# Patient Record
Sex: MALE | Race: WHITE | NOT HISPANIC OR LATINO | Employment: FULL TIME | URBAN - METROPOLITAN AREA
[De-identification: names, ages, dates, MRNs, and addresses within clinical notes are randomized per-mention and may not be internally consistent; named-entity substitution may affect disease eponyms.]

---

## 2017-08-07 ENCOUNTER — HOSPITAL ENCOUNTER (EMERGENCY)
Facility: HOSPITAL | Age: 27
Discharge: PSYCHIATRIC OR SUBSTANCE USE FACILITY | End: 2017-08-08
Attending: EMERGENCY MEDICINE
Payer: MEDICAID

## 2017-08-07 DIAGNOSIS — F29 PSYCHOSIS, UNSPECIFIED PSYCHOSIS TYPE: Primary | ICD-10-CM

## 2017-08-07 PROBLEM — F41.1 ANXIETY STATE, UNSPECIFIED: Status: ACTIVE | Noted: 2017-08-07

## 2017-08-07 PROBLEM — F33.1 MODERATE EPISODE OF RECURRENT MAJOR DEPRESSIVE DISORDER: Chronic | Status: ACTIVE | Noted: 2017-08-07

## 2017-08-07 PROBLEM — F10.20 ALCOHOL USE DISORDER, MODERATE, IN CONTROLLED ENVIRONMENT: Status: ACTIVE | Noted: 2017-08-07

## 2017-08-07 PROBLEM — F43.10 PTSD (POST-TRAUMATIC STRESS DISORDER): Chronic | Status: ACTIVE | Noted: 2017-08-07

## 2017-08-07 PROBLEM — F11.20: Chronic | Status: ACTIVE | Noted: 2017-08-07

## 2017-08-07 LAB
ALBUMIN SERPL BCP-MCNC: 3.6 G/DL
ALP SERPL-CCNC: 91 U/L
ALT SERPL W/O P-5'-P-CCNC: 16 U/L
AMPHET+METHAMPHET UR QL: NEGATIVE
ANION GAP SERPL CALC-SCNC: 9 MMOL/L
APAP SERPL-MCNC: <3 UG/ML
AST SERPL-CCNC: 25 U/L
BARBITURATES UR QL SCN>200 NG/ML: NEGATIVE
BASOPHILS # BLD AUTO: 0.01 K/UL
BASOPHILS NFR BLD: 0.1 %
BENZODIAZ UR QL SCN>200 NG/ML: NEGATIVE
BILIRUB SERPL-MCNC: 0.6 MG/DL
BILIRUB UR QL STRIP: NEGATIVE
BUN SERPL-MCNC: 13 MG/DL
BZE UR QL SCN: NEGATIVE
CALCIUM SERPL-MCNC: 9.7 MG/DL
CANNABINOIDS UR QL SCN: NORMAL
CHLORIDE SERPL-SCNC: 98 MMOL/L
CLARITY UR REFRACT.AUTO: ABNORMAL
CO2 SERPL-SCNC: 29 MMOL/L
COLOR UR AUTO: ABNORMAL
CREAT SERPL-MCNC: 0.9 MG/DL
CREAT UR-MCNC: 367 MG/DL
DIFFERENTIAL METHOD: ABNORMAL
EOSINOPHIL # BLD AUTO: 0.2 K/UL
EOSINOPHIL NFR BLD: 1.8 %
ERYTHROCYTE [DISTWIDTH] IN BLOOD BY AUTOMATED COUNT: 14.3 %
EST. GFR  (AFRICAN AMERICAN): >60 ML/MIN/1.73 M^2
EST. GFR  (NON AFRICAN AMERICAN): >60 ML/MIN/1.73 M^2
ETHANOL SERPL-MCNC: <10 MG/DL
GLUCOSE SERPL-MCNC: 77 MG/DL
GLUCOSE UR QL STRIP: NEGATIVE
HCT VFR BLD AUTO: 47.6 %
HGB BLD-MCNC: 16 G/DL
HGB UR QL STRIP: NEGATIVE
KETONES UR QL STRIP: NEGATIVE
LEUKOCYTE ESTERASE UR QL STRIP: NEGATIVE
LYMPHOCYTES # BLD AUTO: 2.3 K/UL
LYMPHOCYTES NFR BLD: 24.5 %
MCH RBC QN AUTO: 29.4 PG
MCHC RBC AUTO-ENTMCNC: 33.6 G/DL
MCV RBC AUTO: 88 FL
METHADONE UR QL SCN>300 NG/ML: NEGATIVE
MONOCYTES # BLD AUTO: 1.1 K/UL
MONOCYTES NFR BLD: 11.2 %
NEUTROPHILS # BLD AUTO: 5.9 K/UL
NEUTROPHILS NFR BLD: 62.2 %
NITRITE UR QL STRIP: NEGATIVE
OPIATES UR QL SCN: NORMAL
PCP UR QL SCN>25 NG/ML: NEGATIVE
PH UR STRIP: 6 [PH] (ref 5–8)
PLATELET # BLD AUTO: 244 K/UL
PMV BLD AUTO: 10.4 FL
POTASSIUM SERPL-SCNC: 4 MMOL/L
PROT SERPL-MCNC: 6.8 G/DL
PROT UR QL STRIP: ABNORMAL
RBC # BLD AUTO: 5.44 M/UL
SODIUM SERPL-SCNC: 136 MMOL/L
SP GR UR STRIP: 1.01 (ref 1–1.03)
TOXICOLOGY INFORMATION: NORMAL
TSH SERPL DL<=0.005 MIU/L-ACNC: 1.7 UIU/ML
URN SPEC COLLECT METH UR: ABNORMAL
UROBILINOGEN UR STRIP-ACNC: NEGATIVE EU/DL
WBC # BLD AUTO: 9.55 K/UL

## 2017-08-07 PROCEDURE — 85025 COMPLETE CBC W/AUTO DIFF WBC: CPT

## 2017-08-07 PROCEDURE — 80053 COMPREHEN METABOLIC PANEL: CPT

## 2017-08-07 PROCEDURE — 80307 DRUG TEST PRSMV CHEM ANLYZR: CPT

## 2017-08-07 PROCEDURE — 80329 ANALGESICS NON-OPIOID 1 OR 2: CPT

## 2017-08-07 PROCEDURE — 81003 URINALYSIS AUTO W/O SCOPE: CPT

## 2017-08-07 PROCEDURE — 84443 ASSAY THYROID STIM HORMONE: CPT

## 2017-08-07 PROCEDURE — 80320 DRUG SCREEN QUANTALCOHOLS: CPT

## 2017-08-07 PROCEDURE — 25000003 PHARM REV CODE 250: Performed by: PSYCHIATRY & NEUROLOGY

## 2017-08-07 RX ORDER — DIAZEPAM 5 MG/1
10 TABLET ORAL 3 TIMES DAILY
Status: DISCONTINUED | OUTPATIENT
Start: 2017-08-07 | End: 2017-08-08 | Stop reason: HOSPADM

## 2017-08-07 RX ORDER — METHOCARBAMOL 500 MG/1
500 TABLET, FILM COATED ORAL 4 TIMES DAILY PRN
Status: DISCONTINUED | OUTPATIENT
Start: 2017-08-08 | End: 2017-08-08 | Stop reason: HOSPADM

## 2017-08-07 RX ORDER — DICYCLOMINE HYDROCHLORIDE 20 MG/1
20 TABLET ORAL EVERY 6 HOURS PRN
Status: DISCONTINUED | OUTPATIENT
Start: 2017-08-08 | End: 2017-08-08 | Stop reason: HOSPADM

## 2017-08-07 RX ORDER — CLONIDINE HYDROCHLORIDE 0.1 MG/1
0.1 TABLET ORAL EVERY 4 HOURS PRN
Status: DISCONTINUED | OUTPATIENT
Start: 2017-08-08 | End: 2017-08-08 | Stop reason: HOSPADM

## 2017-08-07 RX ORDER — LORAZEPAM 2 MG/ML
2 INJECTION INTRAMUSCULAR EVERY 4 HOURS PRN
Status: DISCONTINUED | OUTPATIENT
Start: 2017-08-08 | End: 2017-08-07

## 2017-08-07 RX ORDER — FLUOXETINE HYDROCHLORIDE 20 MG/1
40 CAPSULE ORAL DAILY
Status: DISCONTINUED | OUTPATIENT
Start: 2017-08-08 | End: 2017-08-08 | Stop reason: HOSPADM

## 2017-08-07 RX ORDER — IBUPROFEN 600 MG/1
600 TABLET ORAL EVERY 6 HOURS PRN
Status: DISCONTINUED | OUTPATIENT
Start: 2017-08-08 | End: 2017-08-08 | Stop reason: HOSPADM

## 2017-08-07 RX ORDER — DOCUSATE SODIUM 100 MG/1
100 CAPSULE, LIQUID FILLED ORAL DAILY PRN
Status: DISCONTINUED | OUTPATIENT
Start: 2017-08-08 | End: 2017-08-08 | Stop reason: HOSPADM

## 2017-08-07 RX ORDER — LOPERAMIDE HYDROCHLORIDE 2 MG/1
2 CAPSULE ORAL 4 TIMES DAILY PRN
Status: DISCONTINUED | OUTPATIENT
Start: 2017-08-08 | End: 2017-08-08 | Stop reason: HOSPADM

## 2017-08-07 RX ORDER — HYDROXYZINE PAMOATE 50 MG/1
50 CAPSULE ORAL 3 TIMES DAILY PRN
Status: DISCONTINUED | OUTPATIENT
Start: 2017-08-08 | End: 2017-08-08 | Stop reason: HOSPADM

## 2017-08-07 RX ORDER — LORAZEPAM 1 MG/1
2 TABLET ORAL EVERY 4 HOURS PRN
Status: DISCONTINUED | OUTPATIENT
Start: 2017-08-08 | End: 2017-08-08 | Stop reason: HOSPADM

## 2017-08-07 RX ORDER — LORAZEPAM 1 MG/1
2 TABLET ORAL EVERY 4 HOURS PRN
Status: DISCONTINUED | OUTPATIENT
Start: 2017-08-08 | End: 2017-08-07

## 2017-08-07 RX ORDER — ONDANSETRON 4 MG/1
8 TABLET, ORALLY DISINTEGRATING ORAL EVERY 8 HOURS PRN
Status: DISCONTINUED | OUTPATIENT
Start: 2017-08-08 | End: 2017-08-08 | Stop reason: HOSPADM

## 2017-08-07 RX ORDER — LORAZEPAM 2 MG/ML
2 INJECTION INTRAMUSCULAR EVERY 4 HOURS PRN
Status: DISCONTINUED | OUTPATIENT
Start: 2017-08-08 | End: 2017-08-08 | Stop reason: HOSPADM

## 2017-08-07 RX ADMIN — DIAZEPAM 10 MG: 5 TABLET ORAL at 11:08

## 2017-08-07 NOTE — ED NOTES
Bed: [facility] 01  Expected date:   Expected time:   Means of arrival:   Comments:

## 2017-08-07 NOTE — ED TRIAGE NOTES
Reports anxiety, depression, PTSD, and heavy drinking with thoughts of intermittent contemplation of harming self or others. Denies any specific suicide plan or thoughts at this time. Would like to get help to drink less and get on a better path. Last drink last night.    Security and sitter at bedside documenting on flowsheet q 15 minutes and maintain direct visualization.

## 2017-08-07 NOTE — ED PROVIDER NOTES
Encounter Date: 8/7/2017       History     Chief Complaint   Patient presents with    Suicidal     having thoughts of hurting myself,     Pt is a 25yo M with history of anxiety/PTSD and depression who presents with suicidal ideation. The pt states he has been having worsening panic attacks due to PTSD from his time as a . These attacks have been particularly worse over the past month, but he has been treated with Prozac for anxiety and depression over the past year by his PCP. The pt states he has been coping with his anxiety using alcohol, and has been drinking about 1 pint per day. No history of alcohol withdrawal seizures or Dts. The pt also takes suboxone for a history of opioid dependence after using pain medication. No history of heroin use. Pt uses marijuana regulary, last used 4 days ago. He states he has thoughts of suicide when having these attacks, but does not have a specific plan. He is also concerned he will injury someone around him when he drinks and has an anxiety attack.          Review of patient's allergies indicates:  No Known Allergies  Past Medical History:   Diagnosis Date    Anxiety     Depression     PTSD (post-traumatic stress disorder)      Past Surgical History:   Procedure Laterality Date    APPENDECTOMY  2016     History reviewed. No pertinent family history.  Social History   Substance Use Topics    Smoking status: Current Every Day Smoker     Packs/day: 0.75     Years: 10.00     Types: Cigarettes    Smokeless tobacco: Never Used    Alcohol use Yes     Review of Systems   Constitutional: Negative for fatigue and fever.   HENT: Negative for congestion and rhinorrhea.    Eyes: Negative for pain and discharge.   Respiratory: Negative for cough, chest tightness and shortness of breath.    Cardiovascular: Negative for chest pain and leg swelling.   Gastrointestinal: Negative for abdominal distention and abdominal pain.   Genitourinary: Negative for difficulty  urinating and dysuria.   Skin: Negative for color change and pallor.   Neurological: Negative for syncope and headaches.   Psychiatric/Behavioral: Positive for agitation, behavioral problems, sleep disturbance and suicidal ideas. Negative for confusion.       Physical Exam     Initial Vitals [08/07/17 1631]   BP Pulse Resp Temp SpO2   (!) 143/95 97 18 98.2 °F (36.8 °C) 99 %      MAP       111         Physical Exam    Constitutional: He appears well-developed and well-nourished. No distress.   HENT:   Head: Normocephalic and atraumatic.   Eyes: Conjunctivae and EOM are normal.   Neck: Normal range of motion. Neck supple.   Cardiovascular: Normal rate, regular rhythm and normal heart sounds.   Pulmonary/Chest: Breath sounds normal. No respiratory distress. He has no wheezes. He has no rhonchi.   Abdominal: Soft. He exhibits no distension. There is no tenderness.   Musculoskeletal: Normal range of motion. He exhibits no edema.   Neurological: He is alert and oriented to person, place, and time.   Skin: Skin is warm and dry. No rash noted. No erythema.   Psychiatric:   Pt has appropriate affect. He appears clean and kempt. States he has SI and is concerned he will hurt someone with anxiety attacks.          ED Course   Procedures  Labs Reviewed   CBC W/ AUTO DIFFERENTIAL   COMPREHENSIVE METABOLIC PANEL   TSH   URINALYSIS   DRUG SCREEN PANEL, URINE EMERGENCY   ALCOHOL,MEDICAL (ETHANOL)   ACETAMINOPHEN LEVEL             Medical Decision Making:   Initial Assessment:   Pt is a 27yo M with h/o PTSD and depression who presents for psychiatric evaluation, with SI and HI during anxiety attacks. Pt is cooperative and pleasant on exam.  Differential Diagnosis:   Depression  Suicidal behavior  Anxiety/PTSD  Alcohol use dependence  Rule out other drug use (takes suboxone for prior opioid dependence, uses marijuana)  Electrolyte abnormality, hypothyroidism, anemia  ED Management:  Psych screening tests ordered to rule out medical  causes. Consult to psych placed. Pt will be placed in PEC pending Dr. Hernandez's assessment.     Laureano Peguero MD  Resident, PGY-1  7:15 PM    All labs appear within nl limits. Psychiatry at bedside. PEC obtained.    Laureano Peguero MD  Resident, PGY-1  8:21 PM              Attending Attestation:             Attending ED Notes:   Patient evaluated in the ED because of suicidal ideation patient has history of PTSD and depression.  Patient was PEC'd in the ED and psychiatry came and saw the patient and is felt that the patient should be placed in an institution and the patient was transferred because there were no available beds here. Patient is medically cleared for transfer.          ED Course     Clinical Impression:   The encounter diagnosis was Psychosis, unspecified psychosis type.    Disposition:   Disposition: Transferred  Condition: Serious                        Laureano Peguero MD  Resident  08/16/17 0122       Russell Sims MD  08/31/17 1331       Russell Sims MD  11/28/19 9638

## 2017-08-07 NOTE — ED NOTES
Dr. Peguero states okay for patient to remain in clothes until psych or Dr. Hernandez evaluates patient.

## 2017-08-08 VITALS
WEIGHT: 150 LBS | DIASTOLIC BLOOD PRESSURE: 82 MMHG | HEIGHT: 69 IN | HEART RATE: 72 BPM | BODY MASS INDEX: 22.22 KG/M2 | OXYGEN SATURATION: 100 % | RESPIRATION RATE: 18 BRPM | TEMPERATURE: 98 F | SYSTOLIC BLOOD PRESSURE: 134 MMHG

## 2017-08-08 PROCEDURE — 25000003 PHARM REV CODE 250: Performed by: PSYCHIATRY & NEUROLOGY

## 2017-08-08 PROCEDURE — 99285 EMERGENCY DEPT VISIT HI MDM: CPT

## 2017-08-08 PROCEDURE — 99499 UNLISTED E&M SERVICE: CPT | Mod: ,,, | Performed by: EMERGENCY MEDICINE

## 2017-08-08 RX ADMIN — HYDROXYZINE PAMOATE 50 MG: 50 CAPSULE ORAL at 12:08

## 2017-08-08 NOTE — ED NOTES
Admission packet faxed to [facility], [facility], [facility], [facility], [facility]), [facility], [facility], [facility], [facility], [facility], [facility], [facility], [facility], [facility], [facility].

## 2017-08-08 NOTE — ASSESSMENT & PLAN NOTE
- Continue PEC for danger to self and possibly others due to SI. Seek placement on an inpatient psychiatric unit for safety and stabilization  - Continue home Prozac 40 mg daily. Defer changes to inpatient team  - Suicide precautions

## 2017-08-08 NOTE — ASSESSMENT & PLAN NOTE
- Hold Suboxone for now, defer re-starting to inpatient team  - Opiate withdrawal PRNs:  Bentyl 20 mg q6h PRN stomach cramps  Colace 100 mg daily PRN constipation  Vistaril 50 mg TID PRN anxiety, insomnia, or itching  Ibuprofen 600 mg q6h PRN pain or temperature >100.5  Imodium 2 mg QID PRN diarrhea  Robaxin 500 mg QID PRN muscle spasms  Zofran 8 mg q8h PRN nausea  Clonidine 0.1 mg PO q4h PRN opiate withdrawal

## 2017-08-08 NOTE — SUBJECTIVE & OBJECTIVE
Patient History           Medical as of 8/7/2017     Past Medical History Date Comments Source    ADHD (attention deficit hyperactivity disorder) -- -- Provider    Anxiety -- -- Provider    Depression -- -- Provider    Hx of psychiatric care -- -- Provider    Psychiatric problem -- -- Provider    PTSD (post-traumatic stress disorder) -- -- Provider    Pertinent Negatives Date Noted Comments Source    Bipolar disorder 8/7/2017 -- Provider    History of psychiatric hospitalization 8/7/2017 -- Provider    Leeann 8/7/2017 -- Provider    Psychosis 8/7/2017 -- Provider    Schizoaffective disorder 8/7/2017 -- Provider    Seizures 8/7/2017 -- Provider    Suicide attempt 8/7/2017 -- Provider    Therapy 8/7/2017 -- Provider            Surgical as of 8/7/2017     Past Surgical History Laterality Date Comments Source    APPENDECTOMY -- 2016 -- Provider            Family as of 8/7/2017    **None**           Tobacco Use as of 8/7/2017     Smoking Status Smoking Start Date Smoking Quit Date Packs/day Years Used    Current Every Day Smoker -- -- 0.75 10.00    Types Comments Smokeless Tobacco Status Smokeless Tobacco Quit Date Source     Cigarettes -- Never Used -- Provider            Alcohol Use as of 8/7/2017     Alcohol Use Drinks/Week Alcohol/Week Comments Source    Yes -- -- 1 pint of liquor daily Provider            Drug Use as of 8/7/2017     Drug Use Types Frequency Comments Source    Yes  Marijuana -- -- Provider            Sexual Activity as of 8/7/2017     Sexually Active Birth Control Partners Comments Source    Not Asked -- -- -- Provider            Activities of Daily Living as of 8/7/2017     Activities of Daily Living Question Response Comments Source    Patient feels they ought to cut down on drinking/drug use Not Asked -- Provider    Patient annoyed by others criticizing their drinking/drug use Not Asked -- Provider    Patient has felt bad or guilty about drinking/drug use Not Asked -- Provider    Patient has  had a drink/used drugs as an eye opener in the AM Not Asked -- Provider            Social Documentation as of 8/7/2017    Past Psychiatric History:  Previous medication trials: yes, Prozac, Suboxone  Previous hospitalizations: no  Previous suicide attempts: no  History of violence: no  Outpatient psychiatrist: no, sees PCP for prescriptions    Social History:  Marital status: not   Children: none but girlfriend has 1 child who lives with the pt and girlfriend and she is currently pregnant with his child  Employment status: works as a [employer]ever  Education: GED  Special Ed: no  Housing status: with girlfriend and her child  History of physical/sexual abuse: no  Access to gun: yes  Legal history: no    Substance Use History:  Recreational drug use: marijuana  History of IVDU: no  Alcohol use: yes, heavy, drinks 1 pint of liquor per day for the past 6 months  Tobacco use: yes  Rehab history: no    Family Psychiatric History:   Grandmother committed suicide. Pt reports that depression and anxiety run in his family members but did not state specific members    Source: Provider           Occupational as of 8/7/2017    **None**           Socioeconomic as of 8/7/2017     Marital Status Spouse Name Number of Children Years Education Preferred Language Ethnicity Race Source    Single -- -- -- English /White White --         Pertinent History Q A Comments    as of 8/7/2017 Lives with      Place in Birth Order      Lives in      Number of Siblings      Raised by      Legal Involvement      Childhood Trauma      Criminal History of      Financial Status      Highest Level of Education      Does patient have access to a firearm?       Service      Primary Leisure Activity      Spirituality         Review of patient's allergies indicates:  No Known Allergies    No current facility-administered medications on file prior to encounter.      Current Outpatient Prescriptions on File Prior to Encounter    Medication Sig    buprenorphine-naloxone (SUBOXONE) 8-2 mg Film 1 film SL BID    fluoxetine (PROZAC) 20 MG capsule Take 3 capsules (60 mg total) by mouth once daily.    buprenorphine-naloxone (SUBOXONE) 8-2 mg Film 1 film SL BID    busPIRone (BUSPAR) 7.5 MG tablet Take 1 tablet (7.5 mg total) by mouth 2 (two) times daily.    hydrocodone-acetaminophen 5-325mg (NORCO) 5-325 mg per tablet Take 1 tablet by mouth every 4 (four) hours as needed for Pain.    hydrOXYzine pamoate (VISTARIL) 50 MG Cap Take 1 capsule (50 mg total) by mouth every evening.    senna-docusate 8.6-50 mg (SENNA WITH DOCUSATE SODIUM) 8.6-50 mg per tablet Take 1 tablet by mouth once daily.     Psychotherapeutics     Start     Stop Route Frequency Ordered    08/08/17 0900  fluoxetine capsule 40 mg      -- Oral Daily 08/07/17 2312 08/08/17 0011  lorazepam injection 2 mg      -- IM Every 4 hours PRN 08/07/17 2312    08/08/17 0011  lorazepam tablet 2 mg      -- Oral Every 4 hours PRN 08/07/17 2312    08/07/17 2315  diazePAM tablet 10 mg      -- Oral 3 times daily 08/07/17 2312        Review of Systems   Constitutional: Positive for activity change, appetite change, chills, diaphoresis and fatigue. Negative for fever and unexpected weight change.   HENT: Negative for voice change.    Respiratory: Negative.    Cardiovascular: Negative.    Gastrointestinal: Negative.    Psychiatric/Behavioral: Positive for decreased concentration, dysphoric mood, sleep disturbance and suicidal ideas. Negative for agitation, behavioral problems, confusion, hallucinations and self-injury. The patient is nervous/anxious. The patient is not hyperactive.      Objective:     Vital Signs (Most Recent):  Temp: 97.9 °F (36.6 °C) (08/07/17 2032)  Pulse: 69 (08/07/17 2324)  Resp: 18 (08/07/17 2324)  BP: (!) 142/92 (08/07/17 2324)  SpO2: 100 % (08/07/17 2324) Vital Signs (24h Range):  Temp:  [97.9 °F (36.6 °C)-98.2 °F (36.8 °C)] 97.9 °F (36.6 °C)  Pulse:  [69-97] 69  Resp:   "[18] 18  SpO2:  [99 %-100 %] 100 %  BP: (142-151)/() 142/92     Height: 5' 9" (175.3 cm)  Weight: 68 kg (150 lb)  Body mass index is 22.15 kg/m².    No intake or output data in the 24 hours ending 08/07/17 2341    Physical Exam   Psychiatric:   Mental Status Exam:  Appearance: no apparent distress, fair hygiene and grooming, casually dressed  Behavior/Cooperation: calm, cooperative, pleasant  Speech: normal rate/tone/volume  Mood: "depressed"  Affect: constricted, mood-congruent  Thought Process: linear and goal-directed  Thought Content: +SI without a plan; denies HI/AVH  Sensorium: alert, awake   Orientation: person, place, situation  Memory: recent and remote intact  Attention/Concentration: able to attend to interview  Fund of knowledge: intact and appropriate to age and level of education   Insight: limited  Judgement: poor  Impulse Control: fair  Reliability: fair    Nursing note and vitals reviewed.           Significant Labs:   Last 24 Hours:   Recent Lab Results       08/07/17  1830      Benzodiazepines Negative     Methadone metabolites Negative     Phencyclidine Negative     Acetaminophen (Tylenol), Serum <3.0  Comment:  Toxic Levels:  Adults (4 hr post-ingestion).........>150 ug/mL  Adults (12 hr post-ingestion)........>40 ug/mL  Peds (2 hr post-ingestion, liquid)...>225 ug/mL  (L)     Albumin 3.6     Alcohol, Medical, Serum <10     Alkaline Phosphatase 91     ALT 16     Amphetamine Screen, Ur Negative     Anion Gap 9     Appearance, UA Hazy(A)     AST 25     Barbiturate Screen, Ur Negative     Baso # 0.01     Basophil% 0.1     Bilirubin (UA) Negative     Total Bilirubin 0.6  Comment:  For infants and newborns, interpretation of results should be based  on gestational age, weight and in agreement with clinical  observations.  Premature Infant recommended reference ranges:  Up to 24 hours.............<8.0 mg/dL  Up to 48 hours............<12.0 mg/dL  3-5 days..................<15.0 mg/dL  6-29 " days.................<15.0 mg/dL       BUN, Bld 13     Calcium 9.7     Chloride 98     CO2 29     Cocaine (Metab.) Negative     Color, UA Brenda     Creatinine 0.9     Creatinine, Random Ur 367.0  Comment:  The random urine reference ranges provided were established   for 24 hour urine collections.  No reference ranges exist for  random urine specimens.  Correlate clinically.       Differential Method Automated     eGFR if African American >60.0     eGFR if non  >60.0  Comment:  Calculation used to obtain the estimated glomerular filtration  rate (eGFR) is the CKD-EPI equation. Since race is unknown   in our information system, the eGFR values for   -American and Non--American patients are given   for each creatinine result.       Eos # 0.2     Eosinophil% 1.8     Glucose 77     Glucose, UA Negative     Gran # 5.9     Gran% 62.2     Hematocrit 47.6     Hemoglobin 16.0     Ketones, UA Negative     Leukocytes, UA Negative     Lymph # 2.3     Lymph% 24.5     MCH 29.4     MCHC 33.6     MCV 88     Mono # 1.1(H)     Mono% 11.2     MPV 10.4     Nitrite, UA Negative     Occult Blood UA Negative     Opiate Scrn, Ur Presumptive Positive     pH, UA 6.0     Platelets 244     Potassium 4.0     Total Protein 6.8     Protein, UA Trace  Comment:  Recommend a 24 hour urine protein or a urine   protein/creatinine ratio if globulin induced proteinuria is  clinically suspected.  (A)     RBC 5.44     RDW 14.3     Sodium 136     Specific Gravity, UA 1.015     Specimen UA Urine, Clean Catch     Marijuana (THC) Metabolite Presumptive Positive     Toxicology Information SEE COMMENT  Comment:  This screen includes the following classes of drugs at the   listed cut-off:  Benzodiazepines                  200 ng/ml  Methadone                        300 ng/ml  Cocaine metabolite               300 ng/ml  Opiates                          300 ng/ml  Barbiturates                     200 ng/ml  Amphetamines                     1000 ng/ml  Marijuana metabs (THC)            50 ng/ml  Phencyclidine (PCP)               25 ng/ml  High concentrations of Diphenhydramine may cross-react with  Phencyclidine PCP screening immunoassay giving a false   positive result.  High concentrations of Methylenedioxymethamphetamine (MDMA aka  Ectasy) and other structurally similar compounds may cross-   react with the Amphetamine/Methamphetamine screening   immunoassay giving a false positive result.  A metabolite of the anti-HIV drug Sustiva () may cause  false positive results in the Marijuana metabolite (THC)   screening assay.  Note: This exception list includes only more common   interferants in toxicology screen testing.  Because of many   cross-reactantspositive results on toxicology drug screens   should be confirmed whenever results do not correlate with   clinical presentation.  This report is intended for use in clinical monitoring and  management of patients. It is not intended for use in   employment related drug testing.  Because of any cross-reactants, positive results on toxicology  drug screens should be confirmed whenever results do not  correlate with clinical presentation.  Presumptive positive results are unconfirmed and may be used   only for medical purposes.       TSH 1.701     Urobilinogen, UA Negative     WBC 9.55           Significant Imaging: None

## 2017-08-08 NOTE — ED NOTES
[facility] here to  patient. Patient contacted his girlfriend Milly to notify of transfer. Belongings secured into [facility] trunk by [facility] staff. Patient walked to and entered [facility] vehicle with Security Guards in attendance. [facility] Unit [address]; Staff: Armen Talamantes and Devon Lea. Safety maintained.

## 2017-08-08 NOTE — CONSULTS
"[facility]  Psychiatry  Consult Note    Patient Name: Ted Guillaume  MRN: [id number]   Code Status: Prior  Admission Date: 8/7/2017  Hospital Length of Stay: 0 days  Attending Physician: Russell Sims, *  Primary Care Provider: Ronny Caldwell MD    Current Legal Status: [facility]    Patient information was obtained from patient, past medical records and ER records.     Inpatient consult to Psychiatry  Consult performed by: CYRUS MONROE  Consult ordered by: DONAL GROSSMAN        Subjective:     Principal Problem:Moderate episode of recurrent major depressive disorder    Chief Complaint:  SI     HPI: Ted Guillaume is a 26 y.o.  male with a past psych hx of depression, PTSD, anxiety, opiate and alcohol use who presented voluntarily for SI. Pt reports that he has been dealing with depression, anxiety, and PTSD (related to his time as a  for 6 years) for a few years now but his symptoms are getting much worse over the past few months. He has been using alcohol to self-medicate and admits that this has been making his psychiatric symptoms worse. He endorses having frequent "panic attacks" which are usually triggered by things that remind him of past trauma and he will get really scared, anxious, unable to breathe. He also endorses a lot of hypervigilance, difficulty with sleep, and frequent nightmares. He takes Prozac 40 mg daily for depression and anxiety which he gets from his PCP, along with Suboxone for opiate use disorder maintenance therapy. He feels that the Prozac has helped with the depression but not his anxiety. Pt reports drinking 1 pint of liquor daily for the past 6 months and states that when he gets drunk he begins having SI because he "doesn't want to live like this anymore" and would be "better off if [he] killed himself". He denies having a specific plan on how to harm himself. He denies ever having attempted suicide. He reports that he's " "also afraid that he will harm other people when he gets drunk and has these panic attacks because he "looses it". He has not gone a day without drinking in the past 6 months other than today. He endorses current withdrawal symptoms of anxiety, cold sweats, feeling jittery. He denies any hx of seizures, denies benzodiazepine use. On psych ROS he is pan-positive for SIGECAPS. Denies any hx of leeann or psychosis.     Per ED notes:  Pt is a 25yo M with history of anxiety/PTSD and depression who presents with suicidal ideation. The pt states he has been having worsening panic attacks due to PTSD from his time as a . These attacks have been particularly worse over the past month, but he has been treated with Prozac for anxiety and depression over the past year by his PCP. The pt states he has been coping with his anxiety using alcohol, and has been drinking about 1 pint per day. No history of alcohol withdrawal seizures or Dts. The pt also takes suboxone for a history of opioid dependence after using pain medication. No history of heroin use. Pt uses marijuana regulary, last used 4 days ago. He states he has thoughts of suicide when having these attacks, but does not have a specific plan. He is also concerned he will injury someone around him when he drinks and has an anxiety attack.    Hospital Course: No notes on file         Patient History           Medical as of 8/7/2017     Past Medical History Date Comments Source    ADHD (attention deficit hyperactivity disorder) -- -- Provider    Anxiety -- -- Provider    Depression -- -- Provider    Hx of psychiatric care -- -- Provider    Psychiatric problem -- -- Provider    PTSD (post-traumatic stress disorder) -- -- Provider    Pertinent Negatives Date Noted Comments Source    Bipolar disorder 8/7/2017 -- Provider    History of psychiatric hospitalization 8/7/2017 -- Provider    Leeann 8/7/2017 -- Provider    Psychosis 8/7/2017 -- Provider    Schizoaffective " disorder 8/7/2017 -- Provider    Seizures 8/7/2017 -- Provider    Suicide attempt 8/7/2017 -- Provider    Therapy 8/7/2017 -- Provider            Surgical as of 8/7/2017     Past Surgical History Laterality Date Comments Source    APPENDECTOMY -- 2016 -- Provider            Family as of 8/7/2017    **None**           Tobacco Use as of 8/7/2017     Smoking Status Smoking Start Date Smoking Quit Date Packs/day Years Used    Current Every Day Smoker -- -- 0.75 10.00    Types Comments Smokeless Tobacco Status Smokeless Tobacco Quit Date Source     Cigarettes -- Never Used -- Provider            Alcohol Use as of 8/7/2017     Alcohol Use Drinks/Week Alcohol/Week Comments Source    Yes -- -- 1 pint of liquor daily Provider            Drug Use as of 8/7/2017     Drug Use Types Frequency Comments Source    Yes  Marijuana -- -- Provider            Sexual Activity as of 8/7/2017     Sexually Active Birth Control Partners Comments Source    Not Asked -- -- -- Provider            Activities of Daily Living as of 8/7/2017     Activities of Daily Living Question Response Comments Source    Patient feels they ought to cut down on drinking/drug use Not Asked -- Provider    Patient annoyed by others criticizing their drinking/drug use Not Asked -- Provider    Patient has felt bad or guilty about drinking/drug use Not Asked -- Provider    Patient has had a drink/used drugs as an eye opener in the AM Not Asked -- Provider            Social Documentation as of 8/7/2017    Past Psychiatric History:  Previous medication trials: yes, Prozac, Suboxone  Previous hospitalizations: no  Previous suicide attempts: no  History of violence: no  Outpatient psychiatrist: no, sees PCP for prescriptions    Social History:  Marital status: not   Children: none but girlfriend has 1 child who lives with the pt and girlfriend and she is currently pregnant with his child  Employment status: works as a [employer]  Education: GED  Special Ed:  no  Housing status: with girlfriend and her child  History of physical/sexual abuse: no  Access to gun: yes  Legal history: no    Substance Use History:  Recreational drug use: marijuana  History of IVDU: no  Alcohol use: yes, heavy, drinks 1 pint of liquor per day for the past 6 months  Tobacco use: yes  Rehab history: no    Family Psychiatric History:   Grandmother committed suicide. Pt reports that depression and anxiety run in his family members but did not state specific members    Source: Provider           Occupational as of 8/7/2017    **None**           Socioeconomic as of 8/7/2017     Marital Status Spouse Name Number of Children Years Education Preferred Language Ethnicity Race Source    Single -- -- -- English /White White --         Pertinent History Q A Comments    as of 8/7/2017 Lives with      Place in Birth Order      Lives in      Number of Siblings      Raised by      Legal Involvement      Childhood Trauma      Criminal History of      Financial Status      Highest Level of Education      Does patient have access to a firearm?       Service      Primary Leisure Activity      Spirituality         Review of patient's allergies indicates:  No Known Allergies    No current facility-administered medications on file prior to encounter.      Current Outpatient Prescriptions on File Prior to Encounter   Medication Sig    buprenorphine-naloxone (SUBOXONE) 8-2 mg Film 1 film SL BID    fluoxetine (PROZAC) 20 MG capsule Take 3 capsules (60 mg total) by mouth once daily.    buprenorphine-naloxone (SUBOXONE) 8-2 mg Film 1 film SL BID    busPIRone (BUSPAR) 7.5 MG tablet Take 1 tablet (7.5 mg total) by mouth 2 (two) times daily.    hydrocodone-acetaminophen 5-325mg (NORCO) 5-325 mg per tablet Take 1 tablet by mouth every 4 (four) hours as needed for Pain.    hydrOXYzine pamoate (VISTARIL) 50 MG Cap Take 1 capsule (50 mg total) by mouth every evening.    senna-docusate 8.6-50 mg (SENNA  "WITH DOCUSATE SODIUM) 8.6-50 mg per tablet Take 1 tablet by mouth once daily.     Psychotherapeutics     Start     Stop Route Frequency Ordered    08/08/17 0900  fluoxetine capsule 40 mg      -- Oral Daily 08/07/17 2312 08/08/17 0011  lorazepam injection 2 mg      -- IM Every 4 hours PRN 08/07/17 2312 08/08/17 0011  lorazepam tablet 2 mg      -- Oral Every 4 hours PRN 08/07/17 2312 08/07/17 2315  diazePAM tablet 10 mg      -- Oral 3 times daily 08/07/17 2312        Review of Systems   Constitutional: Positive for activity change, appetite change, chills, diaphoresis and fatigue. Negative for fever and unexpected weight change.   HENT: Negative for voice change.    Respiratory: Negative.    Cardiovascular: Negative.    Gastrointestinal: Negative.    Psychiatric/Behavioral: Positive for decreased concentration, dysphoric mood, sleep disturbance and suicidal ideas. Negative for agitation, behavioral problems, confusion, hallucinations and self-injury. The patient is nervous/anxious. The patient is not hyperactive.      Objective:     Vital Signs (Most Recent):  Temp: 97.9 °F (36.6 °C) (08/07/17 2032)  Pulse: 69 (08/07/17 2324)  Resp: 18 (08/07/17 2324)  BP: (!) 142/92 (08/07/17 2324)  SpO2: 100 % (08/07/17 2324) Vital Signs (24h Range):  Temp:  [97.9 °F (36.6 °C)-98.2 °F (36.8 °C)] 97.9 °F (36.6 °C)  Pulse:  [69-97] 69  Resp:  [18] 18  SpO2:  [99 %-100 %] 100 %  BP: (142-151)/() 142/92     Height: 5' 9" (175.3 cm)  Weight: 68 kg (150 lb)  Body mass index is 22.15 kg/m².    No intake or output data in the 24 hours ending 08/07/17 2341    Physical Exam   Psychiatric:   Mental Status Exam:  Appearance: no apparent distress, fair hygiene and grooming, casually dressed  Behavior/Cooperation: calm, cooperative, pleasant  Speech: normal rate/tone/volume  Mood: "depressed"  Affect: constricted, mood-congruent  Thought Process: linear and goal-directed  Thought Content: +SI without a plan; denies " HI/AVH  Sensorium: alert, awake   Orientation: person, place, situation  Memory: recent and remote intact  Attention/Concentration: able to attend to interview  Fund of knowledge: intact and appropriate to age and level of education   Insight: limited  Judgement: poor  Impulse Control: fair  Reliability: fair    Nursing note and vitals reviewed.           Significant Labs:   Last 24 Hours:   Recent Lab Results       08/07/17  1830      Benzodiazepines Negative     Methadone metabolites Negative     Phencyclidine Negative     Acetaminophen (Tylenol), Serum <3.0  Comment:  Toxic Levels:  Adults (4 hr post-ingestion).........>150 ug/mL  Adults (12 hr post-ingestion)........>40 ug/mL  Peds (2 hr post-ingestion, liquid)...>225 ug/mL  (L)     Albumin 3.6     Alcohol, Medical, Serum <10     Alkaline Phosphatase 91     ALT 16     Amphetamine Screen, Ur Negative     Anion Gap 9     Appearance, UA Hazy(A)     AST 25     Barbiturate Screen, Ur Negative     Baso # 0.01     Basophil% 0.1     Bilirubin (UA) Negative     Total Bilirubin 0.6  Comment:  For infants and newborns, interpretation of results should be based  on gestational age, weight and in agreement with clinical  observations.  Premature Infant recommended reference ranges:  Up to 24 hours.............<8.0 mg/dL  Up to 48 hours............<12.0 mg/dL  3-5 days..................<15.0 mg/dL  6-29 days.................<15.0 mg/dL       BUN, Bld 13     Calcium 9.7     Chloride 98     CO2 29     Cocaine (Metab.) Negative     Color, UA Brenda     Creatinine 0.9     Creatinine, Random Ur 367.0  Comment:  The random urine reference ranges provided were established   for 24 hour urine collections.  No reference ranges exist for  random urine specimens.  Correlate clinically.       Differential Method Automated     eGFR if African American >60.0     eGFR if non  >60.0  Comment:  Calculation used to obtain the estimated glomerular filtration  rate (eGFR) is the  CKD-EPI equation. Since race is unknown   in our information system, the eGFR values for   -American and Non--American patients are given   for each creatinine result.       Eos # 0.2     Eosinophil% 1.8     Glucose 77     Glucose, UA Negative     Gran # 5.9     Gran% 62.2     Hematocrit 47.6     Hemoglobin 16.0     Ketones, UA Negative     Leukocytes, UA Negative     Lymph # 2.3     Lymph% 24.5     MCH 29.4     MCHC 33.6     MCV 88     Mono # 1.1(H)     Mono% 11.2     MPV 10.4     Nitrite, UA Negative     Occult Blood UA Negative     Opiate Scrn, Ur Presumptive Positive     pH, UA 6.0     Platelets 244     Potassium 4.0     Total Protein 6.8     Protein, UA Trace  Comment:  Recommend a 24 hour urine protein or a urine   protein/creatinine ratio if globulin induced proteinuria is  clinically suspected.  (A)     RBC 5.44     RDW 14.3     Sodium 136     Specific Gravity, UA 1.015     Specimen UA Urine, Clean Catch     Marijuana (THC) Metabolite Presumptive Positive     Toxicology Information SEE COMMENT  Comment:  This screen includes the following classes of drugs at the   listed cut-off:  Benzodiazepines                  200 ng/ml  Methadone                        300 ng/ml  Cocaine metabolite               300 ng/ml  Opiates                          300 ng/ml  Barbiturates                     200 ng/ml  Amphetamines                    1000 ng/ml  Marijuana metabs (THC)            50 ng/ml  Phencyclidine (PCP)               25 ng/ml  High concentrations of Diphenhydramine may cross-react with  Phencyclidine PCP screening immunoassay giving a false   positive result.  High concentrations of Methylenedioxymethamphetamine (MDMA aka  Ectasy) and other structurally similar compounds may cross-   react with the Amphetamine/Methamphetamine screening   immunoassay giving a false positive result.  A metabolite of the anti-HIV drug Sustiva () may cause  false positive results in the Marijuana metabolite  (THC)   screening assay.  Note: This exception list includes only more common   interferants in toxicology screen testing.  Because of many   cross-reactantspositive results on toxicology drug screens   should be confirmed whenever results do not correlate with   clinical presentation.  This report is intended for use in clinical monitoring and  management of patients. It is not intended for use in   employment related drug testing.  Because of any cross-reactants, positive results on toxicology  drug screens should be confirmed whenever results do not  correlate with clinical presentation.  Presumptive positive results are unconfirmed and may be used   only for medical purposes.       TSH 1.701     Urobilinogen, UA Negative     WBC 9.55           Significant Imaging: None    Assessment/Plan:     Anxiety state, unspecified    - Continue Prozac as above  - Vistaril 50 mg TID PRN for anxiety or insomnia        PTSD (post-traumatic stress disorder)    - Continue Prozac as above  - Would consider trial of Prazosin but will defer to inpatient team        Opioid use disorder, moderate, on maintenance therapy    - Hold Suboxone for now, defer re-starting to inpatient team  - Opiate withdrawal PRNs:  Bentyl 20 mg q6h PRN stomach cramps  Colace 100 mg daily PRN constipation  Vistaril 50 mg TID PRN anxiety, insomnia, or itching  Ibuprofen 600 mg q6h PRN pain or temperature >100.5  Imodium 2 mg QID PRN diarrhea  Robaxin 500 mg QID PRN muscle spasms  Zofran 8 mg q8h PRN nausea  Clonidine 0.1 mg PO q4h PRN opiate withdrawal         Alcohol use disorder, moderate, in controlled environment    - Pt drinks a pint of liquor per day for the past 6 months  - Withdrawal precautions with q4h vital signs  - Valium taper: start 10 mg PO TID   - PRN Ativan for breakthrough withdrawal as evidenced by SBP >160, DBP >100, HR >110        * Moderate episode of recurrent major depressive disorder    - Continue PEC for danger to self and possibly  others due to SI. Seek placement on an inpatient psychiatric unit for safety and stabilization  - Continue home Prozac 40 mg daily. Defer changes to inpatient team  - Suicide precautions             Whitley Solomon MD  [facility] Psychiatry, PGY-2  08/07/2017 11:56 PM

## 2017-08-08 NOTE — ASSESSMENT & PLAN NOTE
- Pt drinks a pint of liquor per day for the past 6 months  - Withdrawal precautions with q4h vital signs  - Valium taper: start 10 mg PO TID   - PRN Ativan for breakthrough withdrawal as evidenced by SBP >160, DBP >100, HR >110

## 2017-08-08 NOTE — ED NOTES
Received call from Ana María at [facility] is approximately 2 hours due to awaiting second ...   Requested that Ana María notify us if the time can be shortened.

## 2017-08-08 NOTE — PROGRESS NOTES
Patient is medically cleared here in ER. Psychiatry is aware of patient. PEC has been signed. Please see initial ED note for HPI and physical exam.    Laureano Peguero MD  Resident, PGY-1  9:10 PM

## 2017-08-08 NOTE — ED NOTES
Received to [place] [id number] via wheelchair accompanied by ED nurse and Security Guards. Belongings secured into locked [facility] closet. Patient got into bed. No acute physical distress. Safety maintained.

## 2017-08-08 NOTE — HPI
"Ted Guillaume is a 26 y.o.  male with a past psych hx of depression, PTSD, anxiety, opiate and alcohol use who presented voluntarily for SI. Pt reports that he has been dealing with depression, anxiety, and PTSD (related to his time as a  for 6 years) for a few years now but his symptoms are getting much worse over the past few months. He has been using alcohol to self-medicate and admits that this has been making his psychiatric symptoms worse. He endorses having frequent "panic attacks" which are usually triggered by things that remind him of past trauma and he will get really scared, anxious, unable to breathe. He also endorses a lot of hypervigilance, difficulty with sleep, and frequent nightmares. He takes Prozac 40 mg daily for depression and anxiety which he gets from his PCP, along with Suboxone for opiate use disorder maintenance therapy. He feels that the Prozac has helped with the depression but not his anxiety. Pt reports drinking 1 pint of liquor daily for the past 6 months and states that when he gets drunk he begins having SI because he "doesn't want to live like this anymore" and would be "better off if [he] killed himself". He denies having a specific plan on how to harm himself. He denies ever having attempted suicide. He reports that he's also afraid that he will harm other people when he gets drunk and has these panic attacks because he "looses it". He has not gone a day without drinking in the past 6 months other than today. He endorses current withdrawal symptoms of anxiety, cold sweats, feeling jittery. He denies any hx of seizures, denies benzodiazepine use. On psych ROS he is pan-positive for SIGECAPS. Denies any hx of claribel or psychosis.   "

## 2017-08-08 NOTE — ED NOTES
Received call from Mahsa at [facility].  Patient accepted under the care of Dr Weiner.      Number to call report:  [phone].    [address], [place]